# Patient Record
Sex: FEMALE | Race: WHITE | NOT HISPANIC OR LATINO | Employment: UNEMPLOYED | URBAN - METROPOLITAN AREA
[De-identification: names, ages, dates, MRNs, and addresses within clinical notes are randomized per-mention and may not be internally consistent; named-entity substitution may affect disease eponyms.]

---

## 2017-04-18 PROBLEM — M25.572 ACUTE LEFT ANKLE PAIN: Status: ACTIVE | Noted: 2017-04-18

## 2017-04-18 PROBLEM — M62.81 MUSCLE WEAKNESS: Status: ACTIVE | Noted: 2017-04-18

## 2017-04-18 PROBLEM — M25.472 EDEMA OF LEFT ANKLE: Status: ACTIVE | Noted: 2017-04-18

## 2017-04-18 PROBLEM — R26.2 DIFFICULTY WALKING: Status: ACTIVE | Noted: 2017-04-18

## 2017-04-18 PROBLEM — Z74.09 IMPAIRED FUNCTIONAL MOBILITY, BALANCE, AND ENDURANCE: Status: ACTIVE | Noted: 2017-04-18

## 2018-02-22 PROBLEM — N64.4 BREAST PAIN IN FEMALE: Status: ACTIVE | Noted: 2018-02-22

## 2018-02-22 PROBLEM — M79.81 NONTRAUMATIC HEMATOMA OF SOFT TISSUE: Status: ACTIVE | Noted: 2018-02-22

## 2018-05-18 PROBLEM — R07.9 CHEST PAIN: Status: ACTIVE | Noted: 2018-02-22

## 2018-05-18 PROBLEM — R06.02 SOB (SHORTNESS OF BREATH): Status: ACTIVE | Noted: 2018-05-18

## 2018-05-23 PROBLEM — R10.13 EPIGASTRIC PAIN: Status: ACTIVE | Noted: 2018-05-23

## 2018-12-27 PROBLEM — R10.9 ABDOMINAL PAIN: Status: ACTIVE | Noted: 2018-12-27

## 2018-12-27 PROBLEM — R11.2 INTRACTABLE NAUSEA AND VOMITING: Status: ACTIVE | Noted: 2018-12-27

## 2018-12-27 PROBLEM — E86.0 DEHYDRATION: Status: ACTIVE | Noted: 2018-12-27

## 2018-12-28 PROBLEM — M54.2 NECK PAIN: Status: ACTIVE | Noted: 2018-12-28

## 2018-12-28 PROBLEM — R74.8 ELEVATED LIVER ENZYMES: Status: ACTIVE | Noted: 2018-12-28

## 2018-12-30 PROBLEM — M25.551 PAIN OF BOTH HIP JOINTS: Status: ACTIVE | Noted: 2018-12-30

## 2018-12-30 PROBLEM — M25.552 PAIN OF BOTH HIP JOINTS: Status: ACTIVE | Noted: 2018-12-30

## 2019-10-28 PROBLEM — R10.2 PELVIC PAIN IN FEMALE: Status: ACTIVE | Noted: 2019-10-28

## 2019-10-28 PROBLEM — R10.2 PELVIC PAIN IN FEMALE: Status: RESOLVED | Noted: 2019-10-28 | Resolved: 2019-10-28

## 2019-12-14 PROBLEM — J45.901 ASTHMATIC BRONCHITIS WITH ACUTE EXACERBATION: Status: ACTIVE | Noted: 2019-12-14

## 2019-12-14 PROBLEM — R05.9 COUGH: Status: ACTIVE | Noted: 2019-12-14

## 2019-12-17 PROBLEM — J98.4 PNEUMONITIS: Status: ACTIVE | Noted: 2019-12-14

## 2020-10-06 PROBLEM — Z78.9 IMPAIRED MOBILITY AND ADLS: Status: ACTIVE | Noted: 2017-04-18

## 2020-10-06 PROBLEM — M54.2 PAINFUL CERVICAL ROM: Status: ACTIVE | Noted: 2020-10-06

## 2020-10-06 PROBLEM — M25.611 DECREASED ROM OF RIGHT SHOULDER: Status: ACTIVE | Noted: 2020-10-06

## 2020-10-06 PROBLEM — M79.601 PAIN IN RIGHT ARM: Status: ACTIVE | Noted: 2020-10-06

## 2020-10-06 PROBLEM — R29.3 ABNORMAL POSTURE: Status: ACTIVE | Noted: 2020-10-06

## 2020-10-22 PROBLEM — M54.2 PAINFUL CERVICAL ROM: Status: RESOLVED | Noted: 2020-10-06 | Resolved: 2020-10-22

## 2020-10-22 PROBLEM — R29.3 ABNORMAL POSTURE: Status: RESOLVED | Noted: 2020-10-06 | Resolved: 2020-10-22

## 2020-10-22 PROBLEM — M25.611 DECREASED ROM OF RIGHT SHOULDER: Status: RESOLVED | Noted: 2020-10-06 | Resolved: 2020-10-22

## 2020-10-22 PROBLEM — Z78.9 IMPAIRED MOBILITY AND ADLS: Status: RESOLVED | Noted: 2017-04-18 | Resolved: 2020-10-22

## 2020-10-22 PROBLEM — M62.81 MUSCLE WEAKNESS: Status: RESOLVED | Noted: 2017-04-18 | Resolved: 2020-10-22

## 2020-10-22 PROBLEM — Z74.09 IMPAIRED MOBILITY AND ADLS: Status: RESOLVED | Noted: 2017-04-18 | Resolved: 2020-10-22

## 2021-05-04 ENCOUNTER — PATIENT MESSAGE (OUTPATIENT)
Dept: RESEARCH | Facility: HOSPITAL | Age: 41
End: 2021-05-04

## 2021-05-10 ENCOUNTER — PATIENT MESSAGE (OUTPATIENT)
Dept: RESEARCH | Facility: HOSPITAL | Age: 41
End: 2021-05-10

## 2024-10-14 ENCOUNTER — TELEPHONE (OUTPATIENT)
Dept: PHARMACY | Facility: CLINIC | Age: 44
End: 2024-10-14

## 2024-10-15 NOTE — TELEPHONE ENCOUNTER
[facility]/Population Health Chart Review & Patient Outreach Details For Medication Adherence Project    Reason for Outreach Encounter: 3rd Party payor non-compliance report ([employer], [employer], C, etc)  2.  Patient Outreach Method: Reviewed patient chart   3.   Medication in question:   Hyperlipidemia Medications               atorvastatin (LIPITOR) 10 MG tablet Take 10 mg by mouth once daily.    gemfibroziL (LOPID) 600 MG tablet Take 600 mg by mouth 2 (two) times daily before meals.                  ATORVASTATIN  last filled  10/10 for 30 day supply      4.  Reviewed and or Updates Made To: Patient Chart  5. Outreach Outcomes and/or actions taken: Patient filled medication and is on track to be adherent  Additional Notes: